# Patient Record
(demographics unavailable — no encounter records)

---

## 2025-02-25 NOTE — PHYSICAL EXAM
[de-identified] : R hand: Mild swelling middle finger Tender 3rd A1 pulley Decreased middle ROM +middle triggering  L hand:  Tender volar wrist  Good finger ROM  +Tinels  +Phalens  +Compression test  Decreased sensation median nerve distribution +thenar atrophy

## 2025-02-25 NOTE — ASSESSMENT
[FreeTextEntry1] :   Carpal tunnel syndrome is a progressive problem that once occurs will be a chronic issue that will likely continue until surgical treatment is necessary. Carpal tunnel may or may not be symptomatic. Treatment options for carpal tunnel include OTC NSAIDS, prescription NSAIDS, ice, bracing, OT, cortisone injection, and surgical release.  He wants to proceed with CTR  FOr L CTR R/B/A of surgery discussed with the patient. Risks including but not limited to infection, nerve damage, tendon damage, pain, stiffness, recurrence, no resolution of symptoms, loss of function, limb or life. They understand and agree to surgery  Return post op    Trigger finger is a progressive problem that once occurs will be a chronic issue that will likely continue until surgical treatment is necessary. Trigger finger creates a chronic change to the tendon/pulley system in the hand that will be present until surgical release. Treatment options for trigger finger include OTC NSAIDS, prescription NSAIDS, ice, splinting, OT, cortisone injection, and surgical release.   I recommend the patient take over the counter medication such as Advil/Motrin/Aleve or Tylenol for pain and inflammation  R MF tendon sheath injection was performed at the A1 pulley because of pain and inflammation under aseptic conditions with betadine and alcohol Anesthesia: ethyl chloride sprayed topically Celestone 6mg/1cc: An injection of Celestone 1cc Lidocaine: An injection of Lidocaine 1% 1cc Marcaine: An injection of Marcaine 0.5% 1cc 25G needle   The risks, benefits, and alternatives to cortisone injection were explained in full to the patient. Risks outlined include but are not limited to infection, sepsis, bleeding, scarring, skin discoloration, temporary increase in pain, syncopal episode, failure to resolve symptoms, allergic reaction, symptom recurrence, and elevation of blood sugar in diabetics. Patient understood the risks. All questions were answered. After discussion of options, patient verbally consented to an injection. Sterile prep was done of the injection site. Patient tolerated the procedure well. Advised to ice the injection site this evening.

## 2025-02-25 NOTE — REASON FOR VISIT
[FreeTextEntry2] : 02/25/2025: 62-year-old male here today for new Injury of the B/L hand. States he had RT hand surgery for carpel tunnel about 7 years ago. Trigger finger on the MF of the RT. Has been diagnosed with psoriasis a year and a half ago. Has noticed stiffness in both hands now,

## 2025-02-25 NOTE — HISTORY OF PRESENT ILLNESS
[5] : 5 [Dull/Aching] : dull/aching [de-identified] : L hand numbness and tingling He had R CTR in the past  R MF trigger